# Patient Record
Sex: MALE | Race: WHITE | NOT HISPANIC OR LATINO | Employment: UNEMPLOYED | URBAN - METROPOLITAN AREA
[De-identification: names, ages, dates, MRNs, and addresses within clinical notes are randomized per-mention and may not be internally consistent; named-entity substitution may affect disease eponyms.]

---
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
## 2025-05-12 ENCOUNTER — APPOINTMENT (OUTPATIENT)
Dept: PRIMARY CARE | Facility: CLINIC | Age: 20
End: 2025-05-12
Payer: COMMERCIAL

## 2025-05-12 VITALS
HEART RATE: 90 BPM | DIASTOLIC BLOOD PRESSURE: 76 MMHG | RESPIRATION RATE: 16 BRPM | SYSTOLIC BLOOD PRESSURE: 118 MMHG | OXYGEN SATURATION: 100 % | BODY MASS INDEX: 18.64 KG/M2 | HEIGHT: 78 IN | WEIGHT: 161.1 LBS

## 2025-05-12 DIAGNOSIS — Q79.8 POLAND ANOMALY: ICD-10-CM

## 2025-05-12 DIAGNOSIS — Z84.89 FAMILY HISTORY OF PHEOCHROMOCYTOMA: ICD-10-CM

## 2025-05-12 DIAGNOSIS — Z11.4 ENCOUNTER FOR SCREENING FOR HIV: ICD-10-CM

## 2025-05-12 DIAGNOSIS — Z23 IMMUNIZATION DUE: Primary | ICD-10-CM

## 2025-05-12 DIAGNOSIS — Z00.00 WELL ADULT HEALTH CHECK: ICD-10-CM

## 2025-05-12 PROBLEM — S82.109A CLOSED FRACTURE OF UPPER END OF TIBIA: Status: RESOLVED | Noted: 2018-04-13 | Resolved: 2025-05-12

## 2025-05-12 PROCEDURE — 90472 IMMUNIZATION ADMIN EACH ADD: CPT | Performed by: INTERNAL MEDICINE

## 2025-05-12 PROCEDURE — 90715 TDAP VACCINE 7 YRS/> IM: CPT | Performed by: INTERNAL MEDICINE

## 2025-05-12 PROCEDURE — 3008F BODY MASS INDEX DOCD: CPT | Performed by: INTERNAL MEDICINE

## 2025-05-12 PROCEDURE — 99395 PREV VISIT EST AGE 18-39: CPT | Performed by: INTERNAL MEDICINE

## 2025-05-12 PROCEDURE — 90471 IMMUNIZATION ADMIN: CPT | Performed by: INTERNAL MEDICINE

## 2025-05-12 PROCEDURE — 90651 9VHPV VACCINE 2/3 DOSE IM: CPT | Performed by: INTERNAL MEDICINE

## 2025-05-12 ASSESSMENT — ENCOUNTER SYMPTOMS
UNEXPECTED WEIGHT CHANGE: 0
ARTHRALGIAS: 0
CHEST TIGHTNESS: 0
NECK PAIN: 0
HEADACHES: 0
POLYDIPSIA: 0
FEVER: 0
PALPITATIONS: 0
VOMITING: 0
CONSTIPATION: 0
SHORTNESS OF BREATH: 0
FREQUENCY: 0
DYSPHORIC MOOD: 0
COUGH: 0
DIZZINESS: 0
DIARRHEA: 0
NAUSEA: 0

## 2025-05-12 ASSESSMENT — PATIENT HEALTH QUESTIONNAIRE - PHQ9
SUM OF ALL RESPONSES TO PHQ9 QUESTIONS 1 AND 2: 0
1. LITTLE INTEREST OR PLEASURE IN DOING THINGS: NOT AT ALL
2. FEELING DOWN, DEPRESSED OR HOPELESS: NOT AT ALL

## 2025-05-12 ASSESSMENT — PAIN SCALES - GENERAL: PAINLEVEL_OUTOF10: 0-NO PAIN
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
## 2025-06-28 LAB
ALBUMIN SERPL-MCNC: 4.6 G/DL (ref 3.6–5.1)
ALP SERPL-CCNC: 71 U/L (ref 36–130)
ALT SERPL-CCNC: 100 U/L (ref 9–46)
ANION GAP SERPL CALCULATED.4IONS-SCNC: 7 MMOL/L (CALC) (ref 7–17)
AST SERPL-CCNC: 68 U/L (ref 10–40)
BILIRUB SERPL-MCNC: 0.8 MG/DL (ref 0.2–1.2)
BUN SERPL-MCNC: 14 MG/DL (ref 7–25)
CALCIUM SERPL-MCNC: 9.7 MG/DL (ref 8.6–10.3)
CHLORIDE SERPL-SCNC: 104 MMOL/L (ref 98–110)
CHOLEST SERPL-MCNC: 166 MG/DL
CHOLEST/HDLC SERPL: 3.9 (CALC)
CO2 SERPL-SCNC: 27 MMOL/L (ref 20–32)
CREAT SERPL-MCNC: 1.16 MG/DL (ref 0.6–1.24)
EGFRCR SERPLBLD CKD-EPI 2021: 92 ML/MIN/1.73M2
ERYTHROCYTE [DISTWIDTH] IN BLOOD BY AUTOMATED COUNT: 12.2 % (ref 11–15)
GLUCOSE SERPL-MCNC: 75 MG/DL (ref 65–99)
HCT VFR BLD AUTO: 45.3 % (ref 38.5–50)
HCV AB SERPL QL IA: NORMAL
HDLC SERPL-MCNC: 43 MG/DL
HGB BLD-MCNC: 14.9 G/DL (ref 13.2–17.1)
HIV 1+2 AB+HIV1 P24 AG SERPL QL IA: NORMAL
HIV 1+2 AB+HIV1 P24 AG SERPL QL IA: NORMAL
LDLC SERPL CALC-MCNC: 101 MG/DL (CALC)
MCH RBC QN AUTO: 28.7 PG (ref 27–33)
MCHC RBC AUTO-ENTMCNC: 32.9 G/DL (ref 32–36)
MCV RBC AUTO: 87.1 FL (ref 80–100)
NONHDLC SERPL-MCNC: 123 MG/DL (CALC)
PLATELET # BLD AUTO: 196 THOUSAND/UL (ref 140–400)
PMV BLD REES-ECKER: 10.4 FL (ref 7.5–12.5)
POTASSIUM SERPL-SCNC: 4.6 MMOL/L (ref 3.5–5.3)
PROT SERPL-MCNC: 7.4 G/DL (ref 6.1–8.1)
RBC # BLD AUTO: 5.2 MILLION/UL (ref 4.2–5.8)
SODIUM SERPL-SCNC: 138 MMOL/L (ref 135–146)
TRIGL SERPL-MCNC: 119 MG/DL
TSH SERPL-ACNC: 0.93 MIU/L (ref 0.4–4.5)
WBC # BLD AUTO: 11 THOUSAND/UL (ref 3.8–10.8)

## 2025-07-14 ENCOUNTER — APPOINTMENT (OUTPATIENT)
Dept: PRIMARY CARE | Facility: CLINIC | Age: 20
End: 2025-07-14
Payer: COMMERCIAL

## 2025-07-14 DIAGNOSIS — Z23 IMMUNIZATION DUE: ICD-10-CM

## 2025-07-14 DIAGNOSIS — Z23 NEED FOR HPV VACCINATION: ICD-10-CM

## 2025-07-14 PROCEDURE — 90471 IMMUNIZATION ADMIN: CPT | Performed by: INTERNAL MEDICINE

## 2025-07-14 PROCEDURE — 90651 9VHPV VACCINE 2/3 DOSE IM: CPT | Performed by: INTERNAL MEDICINE

## 2025-07-14 NOTE — PROGRESS NOTES
Subjective   Patient ID: Fidel Medina is a 19 y.o. male who presents for Nurse Visit.  Patient is here for second HPV vaccine   Gave in right deltoid, tolerated well

## 2025-07-22 ASSESSMENT — ENCOUNTER SYMPTOMS
CONSTIPATION: 1
NAUSEA: 0
FEVER: 0
HEMATOCHEZIA: 0
ABDOMINAL PAIN: 1
FREQUENCY: 0
VOMITING: 0
MYALGIAS: 0
ANOREXIA: 0
WEIGHT LOSS: 0
FLATUS: 1
HEMATURIA: 0
DIARRHEA: 0
ARTHRALGIAS: 0
HEADACHES: 0
DYSURIA: 0
BELCHING: 1

## 2025-07-23 ENCOUNTER — APPOINTMENT (OUTPATIENT)
Dept: PRIMARY CARE | Facility: CLINIC | Age: 20
End: 2025-07-23
Payer: COMMERCIAL

## 2025-07-23 VITALS
OXYGEN SATURATION: 98 % | BODY MASS INDEX: 19.32 KG/M2 | DIASTOLIC BLOOD PRESSURE: 90 MMHG | HEIGHT: 78 IN | SYSTOLIC BLOOD PRESSURE: 120 MMHG | RESPIRATION RATE: 16 BRPM | WEIGHT: 167 LBS | HEART RATE: 80 BPM

## 2025-07-23 DIAGNOSIS — E78.5 HYPERLIPIDEMIA LDL GOAL <100: ICD-10-CM

## 2025-07-23 DIAGNOSIS — R10.32 LEFT LOWER QUADRANT ABDOMINAL PAIN: Primary | ICD-10-CM

## 2025-07-23 DIAGNOSIS — Z84.89 FAMILY HISTORY OF PHEOCHROMOCYTOMA: ICD-10-CM

## 2025-07-23 DIAGNOSIS — Q79.8 POLAND ANOMALY: ICD-10-CM

## 2025-07-23 PROCEDURE — 3008F BODY MASS INDEX DOCD: CPT | Performed by: INTERNAL MEDICINE

## 2025-07-23 PROCEDURE — 99214 OFFICE O/P EST MOD 30 MIN: CPT | Performed by: INTERNAL MEDICINE

## 2025-07-23 RX ORDER — PANTOPRAZOLE SODIUM 40 MG/1
40 TABLET, DELAYED RELEASE ORAL DAILY
Qty: 30 TABLET | Refills: 1 | Status: SHIPPED | OUTPATIENT
Start: 2025-07-23 | End: 2025-09-21

## 2025-07-23 ASSESSMENT — ENCOUNTER SYMPTOMS
CONSTIPATION: 1
ARTHRALGIAS: 0
FEVER: 0
MYALGIAS: 0
FREQUENCY: 0
DYSURIA: 0
HEMATURIA: 0
NAUSEA: 0
ABDOMINAL PAIN: 1
VOMITING: 0
HEADACHES: 0
DIARRHEA: 0

## 2025-07-23 ASSESSMENT — PATIENT HEALTH QUESTIONNAIRE - PHQ9
1. LITTLE INTEREST OR PLEASURE IN DOING THINGS: NOT AT ALL
SUM OF ALL RESPONSES TO PHQ9 QUESTIONS 1 AND 2: 0
2. FEELING DOWN, DEPRESSED OR HOPELESS: NOT AT ALL

## 2025-07-23 ASSESSMENT — PAIN SCALES - GENERAL: PAINLEVEL_OUTOF10: 1

## 2025-07-23 NOTE — PROGRESS NOTES
"Gregory Medina \"Fidel\" is a 19 y.o. male who presents for Follow-up (Lab results and has liver concerns ) and Abdominal Pain (For the last 3-4 weeks).      History of Present Illness  The patient presents for abdominal pain, constipation, and heartburn.    Abdominal Pain  He has been experiencing new onset of abdominal pain, which began abruptly a month ago. The pain, described as a 3 out of 10 at its worst, is primarily located on the left side and central part of his abdomen. It is not constant, occurring for about an hour or two most days. The pain intensifies when he lies down for extended periods or abstains from eating for a long time. He suspects that alcohol consumption may exacerbate the pain, as it worsened after he consumed a couple of beers one night. However, he does not consume other types of alcohol. He recalls an instance where the pain was particularly severe after consuming Santana's, but notes that most foods do not seem to affect the pain. He reports no family history of irritable bowel or inflammatory bowel disease such as Crohn's or ulcerative colitis. He did not consume alcohol a day or two prior to his blood draw in early June 2025.  - Onset: Abruptly a month ago.  - Location: Left side and central part of the abdomen.  - Duration: Occurs for about an hour or two most days.  - Character: Described as a 3 out of 10 at its worst.  - Alleviating/Aggravating Factors: Intensifies when lying down for extended periods or abstaining from eating for a long time; worsened after consuming a couple of beers; severe after consuming Santana's.  - Severity: 3 out of 10 at its worst.    Heartburn  He also reports occasional heartburn, gas, and burping. He experiences reflux symptoms once every couple of days for about an hour. He has been taking antacids and Tums as needed.  - Onset: Occasional.  - Duration: Reflux symptoms last for about an hour.  - Character: Heartburn, gas, burping.  - " "Alleviating Factors: Taking antacids and Tums as needed.    Constipation  Additionally, he has been dealing with constipation, which has made bowel movements more difficult. He has never been unable to use the bathroom completely but has experienced difficulty. He started taking MiraLAX, initially half a capful daily, which resulted in softer, thinner stools of a lighter color. He is now gradually reducing the laxative dosage to a teaspoon daily, and his stools are returning to normal. Prior to this, he had regular bowel movements every day. The longest period he went without a bowel movement was two full days. He still experiences some straining during bowel movements.  - Onset: Recent.  - Duration: Longest period without a bowel movement was two full days.  - Character: Difficulty with bowel movements; softer, thinner stools of a lighter color with MiraLAX; stools returning to normal with reduced dosage.  - Alleviating Factors: MiraLAX, initially half a capful daily, now reduced to a teaspoon daily.  - Severity: Some straining during bowel movements.    Education Level: College student  Occupations: , [employer]   Hobbies: Rock climbing, golf  Diet: Eggs, hill, yogurt, granola, deli sandwiches, grilled sandwiches, chicken, pork, burgers, fish  Alcohol: Drinks beer infrequently, more often when at school    FAMILY HISTORY  He reports no family history of irritable bowel or inflammatory bowel disease like Crohn's or ulcerative colitis.      Review of Systems   Constitutional:  Negative for fever.   Gastrointestinal:  Positive for abdominal pain and constipation. Negative for diarrhea, nausea and vomiting.   Genitourinary:  Negative for dysuria, frequency and hematuria.   Musculoskeletal:  Negative for arthralgias and myalgias.   Neurological:  Negative for headaches.       Objective   /90   Pulse 80   Resp 16   Ht (!) 2.007 m (6' 7\")   Wt 75.8 kg (167 lb)   SpO2 98%   BMI 18.81 " kg/m²       Physical Exam  Constitutional:       Appearance: Normal appearance.   HENT:      Head: Normocephalic.     Eyes:      Conjunctiva/sclera: Conjunctivae normal.       Cardiovascular:      Rate and Rhythm: Normal rate and regular rhythm.   Pulmonary:      Effort: Pulmonary effort is normal.      Breath sounds: Normal breath sounds.     Musculoskeletal:         General: Normal range of motion.      Cervical back: Neck supple.     Skin:     General: Skin is warm and dry.     Neurological:      General: No focal deficit present.      Mental Status: He is alert and oriented to person, place, and time.     Psychiatric:         Mood and Affect: Mood normal.         Assessment & Plan  Left lower quadrant abdominal pain    Orders:    Hepatic function panel; Future    CBC and Auto Differential; Future    Basic metabolic panel; Future    pantoprazole (ProtoNix) 40 mg EC tablet; Take 1 tablet (40 mg) by mouth once daily. Do not crush, chew, or split.    Celiac Panel; Future       Assessment & Plan  1. Abdominal pain: Improving.  - Gluten panel to rule out gluten enteropathy.  - Incorporate more fiber into diet, including grains and nuts as protein sources instead of hill.  - Abstain from alcohol.    2. Constipation: Improving.  - Continue incorporating fiber into diet to maintain regular bowel movements.  - Fiber tablets recommended to aid in bowel regularity.    3. Heartburn: Occasional.  - Pantoprazole 30 tablets prescribed to manage symptoms.  - Monitor symptoms and report any changes.    4. Elevated liver enzymes.  - AST and ALT levels slightly elevated.  - Liver function tests will be repeated to monitor levels.  - Ultrasound will be considered if enzyme levels remain elevated.    Follow-up  - A follow-up visit is scheduled in 2 weeks.      Armando Rey DO   This medical note was created with the assistance of artificial intelligence (AI) for documentation purposes. The content has been reviewed and  confirmed by the healthcare provider for accuracy and completeness. Patient consented to the use of audio recording and use of AI during their visit.   Answers submitted by the patient for this visit:  Abdominal Pain Questionnaire (Submitted on 7/22/2025)  Chief Complaint: Abdominal pain  Chronicity: new  Onset: 1 to 4 weeks ago  Onset quality: sudden  Frequency: 2 to 4 times per day  Episode duration: 3 Hours  Progression since onset: gradually improving  Pain location: LLQ, LUQ, left flank  Pain - numeric: 2/10  Pain quality: dull  Radiates to: LLQ, LUQ, epigastric region, chest, left flank  anorexia: No  belching: Yes  flatus: Yes  hematochezia: No  melena: No  weight loss: No  Aggravated by: drinking alcohol, eating  Relieved by: eating, movement, passing flatus

## 2025-07-25 LAB
ALBUMIN SERPL-MCNC: 4.9 G/DL (ref 3.6–5.1)
ALBUMIN/GLOB SERPL: 1.9 (CALC) (ref 1–2.5)
ALP SERPL-CCNC: 55 U/L (ref 36–130)
ALT SERPL-CCNC: 33 U/L (ref 9–46)
ANION GAP SERPL CALCULATED.4IONS-SCNC: 9 MMOL/L (CALC) (ref 7–17)
AST SERPL-CCNC: 22 U/L (ref 10–40)
BASOPHILS # BLD AUTO: 83 CELLS/UL (ref 0–200)
BASOPHILS NFR BLD AUTO: 1.5 %
BILIRUB DIRECT SERPL-MCNC: 0.1 MG/DL
BILIRUB INDIRECT SERPL-MCNC: 0.6 MG/DL (CALC) (ref 0.2–1.2)
BILIRUB SERPL-MCNC: 0.7 MG/DL (ref 0.2–1.2)
BUN SERPL-MCNC: 15 MG/DL (ref 7–25)
BUN/CREAT SERPL: NORMAL (CALC) (ref 6–22)
CALCIUM SERPL-MCNC: 9.8 MG/DL (ref 8.6–10.3)
CHLORIDE SERPL-SCNC: 104 MMOL/L (ref 98–110)
CO2 SERPL-SCNC: 25 MMOL/L (ref 20–32)
CREAT SERPL-MCNC: 1.15 MG/DL (ref 0.6–1.24)
EGFRCR SERPLBLD CKD-EPI 2021: 93 ML/MIN/1.73M2
EOSINOPHIL # BLD AUTO: 402 CELLS/UL (ref 15–500)
EOSINOPHIL NFR BLD AUTO: 7.3 %
ERYTHROCYTE [DISTWIDTH] IN BLOOD BY AUTOMATED COUNT: 12.4 % (ref 11–15)
GLIADIN IGA SER IA-ACNC: <1 U/ML
GLIADIN IGG SER IA-ACNC: <1 U/ML
GLOBULIN SER CALC-MCNC: 2.6 G/DL (CALC) (ref 1.9–3.7)
GLUCOSE SERPL-MCNC: 79 MG/DL (ref 65–99)
HCT VFR BLD AUTO: 45.7 % (ref 38.5–50)
HGB BLD-MCNC: 15 G/DL (ref 13.2–17.1)
IGA SERPL-MCNC: 181 MG/DL (ref 47–310)
LYMPHOCYTES # BLD AUTO: 2629 CELLS/UL (ref 850–3900)
LYMPHOCYTES NFR BLD AUTO: 47.8 %
MCH RBC QN AUTO: 28.5 PG (ref 27–33)
MCHC RBC AUTO-ENTMCNC: 32.8 G/DL (ref 32–36)
MCV RBC AUTO: 86.9 FL (ref 80–100)
MONOCYTES # BLD AUTO: 435 CELLS/UL (ref 200–950)
MONOCYTES NFR BLD AUTO: 7.9 %
NEUTROPHILS # BLD AUTO: 1953 CELLS/UL (ref 1500–7800)
NEUTROPHILS NFR BLD AUTO: 35.5 %
PLATELET # BLD AUTO: 212 THOUSAND/UL (ref 140–400)
PMV BLD REES-ECKER: 10.4 FL (ref 7.5–12.5)
POTASSIUM SERPL-SCNC: 4.2 MMOL/L (ref 3.5–5.3)
PROT SERPL-MCNC: 7.5 G/DL (ref 6.1–8.1)
RBC # BLD AUTO: 5.26 MILLION/UL (ref 4.2–5.8)
SODIUM SERPL-SCNC: 138 MMOL/L (ref 135–146)
TTG IGA SER-ACNC: <1 U/ML
TTG IGG SER-ACNC: <1 U/ML
WBC # BLD AUTO: 5.5 THOUSAND/UL (ref 3.8–10.8)

## 2025-07-28 PROBLEM — E78.5 HYPERLIPIDEMIA LDL GOAL <100: Status: ACTIVE | Noted: 2025-07-28

## 2025-07-28 PROBLEM — E78.00 ELEVATED LDL CHOLESTEROL LEVEL: Status: RESOLVED | Noted: 2023-05-24 | Resolved: 2025-07-28

## 2025-07-28 PROBLEM — R10.32 LEFT LOWER QUADRANT ABDOMINAL PAIN: Status: ACTIVE | Noted: 2025-07-28

## 2025-07-28 NOTE — ASSESSMENT & PLAN NOTE
Orders:    Hepatic function panel; Future    CBC and Auto Differential; Future    Basic metabolic panel; Future    pantoprazole (ProtoNix) 40 mg EC tablet; Take 1 tablet (40 mg) by mouth once daily. Do not crush, chew, or split.    Celiac Panel; Future

## 2025-08-12 ENCOUNTER — APPOINTMENT (OUTPATIENT)
Dept: PRIMARY CARE | Facility: CLINIC | Age: 20
End: 2025-08-12
Payer: COMMERCIAL

## 2025-12-29 ENCOUNTER — APPOINTMENT (OUTPATIENT)
Dept: PRIMARY CARE | Facility: CLINIC | Age: 20
End: 2025-12-29
Payer: COMMERCIAL

## 2026-05-12 ENCOUNTER — APPOINTMENT (OUTPATIENT)
Dept: PRIMARY CARE | Facility: CLINIC | Age: 21
End: 2026-05-12
Payer: COMMERCIAL